# Patient Record
Sex: MALE | Race: BLACK OR AFRICAN AMERICAN | NOT HISPANIC OR LATINO | Employment: FULL TIME | URBAN - METROPOLITAN AREA
[De-identification: names, ages, dates, MRNs, and addresses within clinical notes are randomized per-mention and may not be internally consistent; named-entity substitution may affect disease eponyms.]

---

## 2024-08-09 ENCOUNTER — OFFICE VISIT (OUTPATIENT)
Dept: FAMILY MEDICINE CLINIC | Facility: CLINIC | Age: 39
End: 2024-08-09
Payer: COMMERCIAL

## 2024-08-09 VITALS
TEMPERATURE: 97.3 F | SYSTOLIC BLOOD PRESSURE: 144 MMHG | HEART RATE: 96 BPM | BODY MASS INDEX: 32.18 KG/M2 | WEIGHT: 205 LBS | OXYGEN SATURATION: 98 % | DIASTOLIC BLOOD PRESSURE: 82 MMHG | HEIGHT: 67 IN

## 2024-08-09 DIAGNOSIS — I10 HYPERTENSION, UNSPECIFIED TYPE: ICD-10-CM

## 2024-08-09 DIAGNOSIS — Z13.6 SCREENING FOR ISCHEMIC HEART DISEASE: ICD-10-CM

## 2024-08-09 DIAGNOSIS — Z13.0 SCREENING FOR DEFICIENCY ANEMIA: ICD-10-CM

## 2024-08-09 DIAGNOSIS — F32.A DEPRESSION, UNSPECIFIED DEPRESSION TYPE: Primary | ICD-10-CM

## 2024-08-09 PROCEDURE — 99214 OFFICE O/P EST MOD 30 MIN: CPT | Performed by: STUDENT IN AN ORGANIZED HEALTH CARE EDUCATION/TRAINING PROGRAM

## 2024-08-09 RX ORDER — LOSARTAN POTASSIUM 25 MG/1
25 TABLET ORAL DAILY
Qty: 30 TABLET | Refills: 1 | Status: SHIPPED | OUTPATIENT
Start: 2024-08-09

## 2024-08-09 NOTE — ASSESSMENT & PLAN NOTE
Chronic problem.  Recently anxiety has been worse than depression.  Previously on Zoloft   Restart Zoloft.  Follow-up in 4 to 6 weeks.

## 2024-08-09 NOTE — ASSESSMENT & PLAN NOTE
Elevated blood pressure in office today as well as on home readings.  Asymptomatic.  Family history of hypertension with cardiovascular disease.  Start losartan 25 mg daily.  Continue blood pressure monitoring with goal less than 130/80.

## 2024-08-09 NOTE — PROGRESS NOTES
"Chief Complaint  Establish Care and Depression    Subjective        Shabbir Harper presents to [facility]  History of Present Illness  38-year-old male with depression who presents to establish care today.    Depression has been a longstanding problem for him.  His father passed away when he was young and he was put on Zoloft at age 16.  Remained on this for about a year and is not quite sure why he came off.  Has not been on any medication since.  He has noticed worsening anxiety recently.    Blood pressure has been elevated outside the office.  He checks this at work.  Sister is a medical assistant and when he sends readings to her she tells him they are high.  He is asymptomatic.    Family history of hypertension in his mother and cardiac disease in his mother and grandparents.  He believes that his mother had first heart attack in her 40s.      Objective   Vital Signs:  /82   Pulse 96   Temp 97.3 °F (36.3 °C)   Ht 170.2 cm (67\")   Wt 93 kg (205 lb)   SpO2 98%   BMI 32.11 kg/m²   Estimated body mass index is 32.11 kg/m² as calculated from the following:    Height as of this encounter: 170.2 cm (67\").    Weight as of this encounter: 93 kg (205 lb).         Physical Exam  Constitutional:       General: He is not in acute distress.  Eyes:      Conjunctiva/sclera: Conjunctivae normal.   Cardiovascular:      Rate and Rhythm: Normal rate and regular rhythm.   Pulmonary:      Effort: Pulmonary effort is normal. No respiratory distress.      Breath sounds: Normal breath sounds.   Abdominal:      Palpations: Abdomen is soft.      Tenderness: There is no abdominal tenderness.   Skin:     General: Skin is warm and dry.   Neurological:      Mental Status: He is alert and oriented to person, place, and time.   Psychiatric:         Mood and Affect: Mood normal.         Behavior: Behavior normal.        Result Review :    The following data was reviewed by: Ana Casanova MD on " 08/09/2024:    Data reviewed : none             Assessment and Plan     Diagnoses and all orders for this visit:    1. Depression, unspecified depression type (Primary)  Assessment & Plan:  Chronic problem.  Recently anxiety has been worse than depression.  Previously on Zoloft   Restart Zoloft.  Follow-up in 4 to 6 weeks.    Orders:  -     sertraline (Zoloft) 50 MG tablet; Take 1 tablet by mouth Daily.  Dispense: 30 tablet; Refill: 1    2. Hypertension, unspecified type  Assessment & Plan:  Elevated blood pressure in office today as well as on home readings.  Asymptomatic.  Family history of hypertension with cardiovascular disease.  Start losartan 25 mg daily.  Continue blood pressure monitoring with goal less than 130/80.    Orders:  -     losartan (Cozaar) 25 MG tablet; Take 1 tablet by mouth Daily.  Dispense: 30 tablet; Refill: 1    3. Screening for ischemic heart disease  -     Comprehensive Metabolic Panel; Future  -     ORDER: Hemoglobin A1c; Future  -     Lipid Panel; Future  -     TSH; Future    4. Screening for deficiency anemia  -     CBC Auto Differential; Future             Follow Up     Return in about 6 weeks (around 9/20/2024) for Annual physical.  Patient was given instructions and counseling regarding his condition or for health maintenance advice. Please see specific information pulled into the AVS if appropriate.

## 2024-09-24 ENCOUNTER — OFFICE VISIT (OUTPATIENT)
Dept: FAMILY MEDICINE CLINIC | Facility: CLINIC | Age: 39
End: 2024-09-24
Payer: COMMERCIAL

## 2024-09-24 VITALS
HEIGHT: 69 IN | BODY MASS INDEX: 30.36 KG/M2 | SYSTOLIC BLOOD PRESSURE: 128 MMHG | HEART RATE: 79 BPM | OXYGEN SATURATION: 99 % | TEMPERATURE: 98 F | WEIGHT: 205 LBS | DIASTOLIC BLOOD PRESSURE: 88 MMHG

## 2024-09-24 DIAGNOSIS — F32.A DEPRESSION, UNSPECIFIED DEPRESSION TYPE: ICD-10-CM

## 2024-09-24 DIAGNOSIS — I10 HYPERTENSION, UNSPECIFIED TYPE: ICD-10-CM

## 2024-09-24 DIAGNOSIS — Z00.00 ENCOUNTER FOR HEALTH MAINTENANCE EXAMINATION IN ADULT: Primary | ICD-10-CM

## 2024-09-24 DIAGNOSIS — E66.9 OBESITY (BMI 30-39.9): ICD-10-CM

## 2024-09-24 PROCEDURE — 99395 PREV VISIT EST AGE 18-39: CPT | Performed by: STUDENT IN AN ORGANIZED HEALTH CARE EDUCATION/TRAINING PROGRAM

## 2024-09-24 RX ORDER — LOSARTAN POTASSIUM 25 MG/1
25 TABLET ORAL DAILY
Qty: 90 TABLET | Refills: 3 | Status: SHIPPED | OUTPATIENT
Start: 2024-09-24

## 2025-04-28 ENCOUNTER — TELEPHONE (OUTPATIENT)
Dept: FAMILY MEDICINE CLINIC | Facility: CLINIC | Age: 40
End: 2025-04-28

## 2025-04-28 NOTE — TELEPHONE ENCOUNTER
Caller: NELSON BILLING    Relationship:     Best call back number: [phone]      What was the call regarding: [facility] CALLING NEEDS INSURANCE INFORMATION QUESTION ABOUT LABS  09/17/2024 SENT BY DR AKBAR     PLEASE GIVE CALLBACK